# Patient Record
Sex: FEMALE | Race: WHITE | Employment: FULL TIME | URBAN - METROPOLITAN AREA
[De-identification: names, ages, dates, MRNs, and addresses within clinical notes are randomized per-mention and may not be internally consistent; named-entity substitution may affect disease eponyms.]

---

## 2024-05-22 ENCOUNTER — HOSPITAL ENCOUNTER (OUTPATIENT)
Facility: HOSPITAL | Age: 49
Setting detail: OUTPATIENT SURGERY
Discharge: HOME | End: 2024-05-22
Attending: INTERNAL MEDICINE | Admitting: INTERNAL MEDICINE
Payer: COMMERCIAL

## 2024-05-22 VITALS
OXYGEN SATURATION: 99 % | HEART RATE: 74 BPM | BODY MASS INDEX: 34.74 KG/M2 | SYSTOLIC BLOOD PRESSURE: 114 MMHG | WEIGHT: 208.5 LBS | TEMPERATURE: 98 F | DIASTOLIC BLOOD PRESSURE: 70 MMHG | HEIGHT: 65 IN | RESPIRATION RATE: 17 BRPM

## 2024-05-22 LAB — HCG UR QL: NEGATIVE

## 2024-05-22 PROCEDURE — 6360000002 HC RX W HCPCS: Performed by: INTERNAL MEDICINE

## 2024-05-22 PROCEDURE — 99153 MOD SED SAME PHYS/QHP EA: CPT | Performed by: INTERNAL MEDICINE

## 2024-05-22 PROCEDURE — 2709999900 HC NON-CHARGEABLE SUPPLY: Performed by: INTERNAL MEDICINE

## 2024-05-22 PROCEDURE — 3600007512: Performed by: INTERNAL MEDICINE

## 2024-05-22 PROCEDURE — 88305 TISSUE EXAM BY PATHOLOGIST: CPT

## 2024-05-22 PROCEDURE — 7100000010 HC PHASE II RECOVERY - FIRST 15 MIN: Performed by: INTERNAL MEDICINE

## 2024-05-22 PROCEDURE — 7100000011 HC PHASE II RECOVERY - ADDTL 15 MIN: Performed by: INTERNAL MEDICINE

## 2024-05-22 PROCEDURE — 99152 MOD SED SAME PHYS/QHP 5/>YRS: CPT | Performed by: INTERNAL MEDICINE

## 2024-05-22 PROCEDURE — 81025 URINE PREGNANCY TEST: CPT

## 2024-05-22 PROCEDURE — 3600007502: Performed by: INTERNAL MEDICINE

## 2024-05-22 RX ORDER — FENTANYL CITRATE 50 UG/ML
INJECTION, SOLUTION INTRAMUSCULAR; INTRAVENOUS PRN
Status: DISCONTINUED | OUTPATIENT
Start: 2024-05-22 | End: 2024-05-22 | Stop reason: ALTCHOICE

## 2024-05-22 RX ORDER — METAXALONE 800 MG/1
800 TABLET ORAL
COMMUNITY
Start: 2024-05-05

## 2024-05-22 RX ORDER — PRAVASTATIN SODIUM 40 MG
40 TABLET ORAL DAILY
COMMUNITY

## 2024-05-22 RX ORDER — TRAZODONE HYDROCHLORIDE 100 MG/1
100 TABLET ORAL
COMMUNITY

## 2024-05-22 RX ORDER — LOSARTAN POTASSIUM 50 MG/1
50 TABLET ORAL DAILY
COMMUNITY
Start: 2024-03-13

## 2024-05-22 RX ORDER — PREDNISONE 10 MG/1
10 TABLET ORAL AS NEEDED
COMMUNITY
Start: 2024-03-22

## 2024-05-22 RX ORDER — SODIUM CHLORIDE 9 MG/ML
INJECTION, SOLUTION INTRAVENOUS CONTINUOUS
Status: DISCONTINUED | OUTPATIENT
Start: 2024-05-22 | End: 2024-05-22 | Stop reason: HOSPADM

## 2024-05-22 RX ORDER — MIDAZOLAM HYDROCHLORIDE 1 MG/ML
INJECTION INTRAMUSCULAR; INTRAVENOUS PRN
Status: DISCONTINUED | OUTPATIENT
Start: 2024-05-22 | End: 2024-05-22 | Stop reason: ALTCHOICE

## 2024-05-22 RX ORDER — FUROSEMIDE 20 MG/1
20 TABLET ORAL DAILY
COMMUNITY
Start: 2024-02-14

## 2024-05-22 RX ORDER — DESVENLAFAXINE SUCCINATE 50 MG/1
50 TABLET, EXTENDED RELEASE ORAL DAILY
COMMUNITY

## 2024-05-22 RX ORDER — MELOXICAM 15 MG/1
15 TABLET ORAL DAILY
COMMUNITY
Start: 2021-05-13

## 2024-05-22 ASSESSMENT — PAIN - FUNCTIONAL ASSESSMENT
PAIN_FUNCTIONAL_ASSESSMENT: NONE - DENIES PAIN
PAIN_FUNCTIONAL_ASSESSMENT: 0-10
PAIN_FUNCTIONAL_ASSESSMENT: NONE - DENIES PAIN
PAIN_FUNCTIONAL_ASSESSMENT: 0-10

## 2024-05-22 ASSESSMENT — PAIN DESCRIPTION - DESCRIPTORS: DESCRIPTORS: ACHING

## 2024-05-22 NOTE — PROCEDURES
[facility]  Colonoscopy Procedure Report  _______________________________________________________  Patient: Sindy Barnes                                        Attending Physician: JOSE Payton MD    Patient ID: [id number]                                    Referring Physician: Gianna Aguirre APRN - [id number]    Exam Date: 5/22/2024     Introduction: A  48 y.o. female patient, presents for inpatient Colonoscopy    Indications: 49 yo male a Pt of [facility], here for her first screening colonoscopy.   No FHx of colon cancer. Asymptomatic of GI complaints. BMI: 35.28 (Short Frame), BM: Irregular every 1 to 3 days. Had appendectomy. HTN, BMI 34.70.  Consent: The benefits, risks, and alternatives to the procedure were discussed and informed consent was obtained from the patient.    Preparation: EKG, pulse, pulse oximetry and blood pressure were monitored throughout the procedure. ASA Classification: Class II- . The heart is an S1-S2 and regular heart rate and rhythm. Lungs are clear to auscultation and percussion. Abdomen is soft, nondistended, and nontender. Mental Status: awake, alert, and oriented to person, place, and time    Medications:  Fentanyl 100 mcg IV before procedure.  Versed 5 mg IV throughout the procedure.    Rectal Exam: Normal Rectal Exam. No Blood.     Pathology Specimens:  1    Procedure:  The colonoscope was passed with difficulty through the anus under direct visualization and advanced to the cecum and 5 cm inside the terminal ileum. Retroflexion is made in the ascending colon. The patient required positioning on the back and some external counter pressure to aid in the passage of the scope. The scope was withdrawn and the mucosa was carefully examined. The quality of the preparation was excellent. The views were excellent. The patient's toleration of the procedure was excellent. The exam was done twice to the cecum. Total time is 23 minutes and withdrawal time is 16

## 2024-05-22 NOTE — PRE SEDATION
Sedation Pre-Procedure Note    Patient Name: Sindy Barnes   YOB: 1975  Room/Bed: ENDO/PL  Medical Record Number: [id number]  Date: 5/22/2024   Time: 10:55 AM       Indication:  screen colon cancer    Consent: I have discussed with the patient and/or the patient representative the indication, alternatives, and the possible risks and/or complications of the planned procedure and the anesthesia methods. The patient and/or patient representative appear to understand and agree to proceed.    Vital Signs:   Vitals:    05/22/24 1010   BP: 131/85   Pulse: 85   Resp: 16   Temp: 98.1 °F (36.7 °C)   SpO2: 100%       Past Medical History:   has a past medical history of Hypertension.    Past Surgical History:   has a past surgical history that includes Appendectomy (2003) and Feminizing augmentation mammoplasty (2007).    Medications:   Scheduled Meds:   Continuous Infusions:   PRN Meds:   Home Meds:   Prior to Admission medications    Medication Sig Start Date End Date Taking? Authorizing Provider   losartan (COZAAR) 50 MG tablet Take 1 tablet by mouth daily 3/13/24  Yes Do Jamison MD   meloxicam (MOBIC) 15 MG tablet Take 1 tablet by mouth daily 5/13/21  Yes Do Jamison MD   metaxalone (SKELAXIN) 800 MG tablet Take 1 tablet by mouth Daily with supper 5/5/24  Yes Do Jamison MD   predniSONE (DELTASONE) 10 MG tablet Take 1 tablet by mouth as needed 3/22/24  Yes Do Jamison MD   furosemide (LASIX) 20 MG tablet Take 1 tablet by mouth daily 2/14/24  Yes Do Jamison MD   pravastatin (PRAVACHOL) 40 MG tablet Take 1 tablet by mouth daily    Do Jamison MD   traZODone (DESYREL) 100 MG tablet Take 1 tablet by mouth nightly    Do Jamison MD   desvenlafaxine succinate (PRISTIQ) 50 MG TB24 extended release tablet Take 1 tablet by mouth daily    Do Jamison MD     Coumadin Use Last 7 Days:  no  Antiplatelet drug therapy use last 7 days:  no  Other anticoagulant use last 7 days: no  Additional Medication Information:  None      Pre-Sedation Documentation and Exam:   Vital signs have been reviewed (see flow sheet for vitals).  I have reviewed the patient's history and review of systems.    Mallampati Airway Assessment:  normal, dentition not prohibitive, normal neck range of motion, Mallampati Class III - (soft palate & base of uvula are visible)    Prior History of Anesthesia Complications:   none    ASA Classification:  Class 2 - A normal healthy patient with mild systemic disease    Sedation/ Anesthesia Plan:   intravenous sedation    Medications Planned:   midazolam (Versed) intravenously and fentanyl intravenously    Patient is an appropriate candidate for plan of sedation: yes    Electronically signed by Ej Payton MD on 5/22/2024 at 10:55 AM

## 2024-05-22 NOTE — DISCHARGE INSTRUCTIONS
Sindy Barnes  [id number]  1975    COLON DISCHARGE INSTRUCTIONS    Discomfort:  Redness at IV site- apply warm compress to area; if redness or soreness persist- contact your physician  There may be a slight amount of blood passed from the rectum  Gaseous discomfort- walking, belching will help relieve any discomfort  You may not operate a vehicle til the next day.  You may not engage in an occupation involving machinery or appliances til the next day.  You may not drink alcoholic beverages til the next day.    DIET:   High fiber diet.     ACTIVITY:  You may not  resume your normal daily activities til the next day. it is recommended that you spend the remainder of the day resting -  avoid any strenuous activity.    CALL M.D.  IF ANY SIGN OF:   Increasing pain, nausea, vomiting  Abdominal distension (swelling)  New increased bleeding (oral or rectal)  Fever (chills)  Pain in chest area  Bloody discharge from nose or mouth  Shortness of breath    You may not  take any Advil, Aspirin, Ibuprofen, Motrin, Aleve, or Goody’s for 5 days, ONLY  Tylenol as needed for pain.    Post procedure diagnosis:  Tortuous and fixed sigmoid. 4 mm sessile polyp in the proximal transversae colon and 3 mm flat polyp in the distal transverse colon, cold snared. Flat harmless lipoma in theproximal ascending colon    Follow-up Instructions:   Your follow up colonoscopy will be in 10 years.    We will notify you the results of your biopsy by letter within 2 weeks.    Ej Payton MD  May 22, 2024      DISCHARGE SUMMARY from Nurse    PATIENT INSTRUCTIONS:    After general anesthesia or intravenous sedation, for 24 hours or while taking prescription Narcotics:  Limit your activities  Do not drive and operate hazardous machinery  Do not make important personal or business decisions  Do  not drink alcoholic beverages  If you have not urinated within 8 hours after discharge, please contact your surgeon on call.    Report the following  provided.  ___________________________________________________________________________________________________________________________________

## 2024-05-22 NOTE — H&P
ssessment/Plan  # Detail Type Description    1. Assessment Encounter for screening colonoscopy (Z12.11).    Impression Pt of [facility], here for her first screening colonoscopy.   Average risk, no FHx of colon cancer. Asymptomatic of GI complaints. BMI: 35.28 (Short Frame), BM: Irregular    Pt reports N/V experienced with drinking barium contrast for CT scan in the past. I have explained to the patient that the bowel prep will be completely dissolved and a water-thin liquid. Pt requests anti-emetic with bowel prep anyway. Will pre-medicate with Reglan for this c-scope procedure (see plans below)..    Patient Plan -Patient is advised that they should take one dose of Reglan 30 minutes before each half of bowel prep, prior to colonoscopy, to pre-medicate for nausea/vomiting.    *C-scope Plan:  Colonoscopy ordered with Dr. Payton with Golytely/Gavilyte bowel prep, and Miralax and stool softeners starting 3 days before prep.  -Patient is advised that they should take their aspirin (if prescribed) up until the day of procedure.  -Patient is advised to take Thyroid meds, BP meds, beta blockers, and any cardiac meds the AM of procedure with sip clear liquid after prep.     *C-scope Risks:  Stressed importance of following all bowel preparation instructions. Explained the procedure to the patient including all risks and benefits.  These risks consist of missed lesions on exam, bleeding, and bowel perforation with possible need for admission to the hospital, and in the most extensive of  circumstances, the patient may require surgery. Pt verbalized understanding of these risks and is agreeable with this procedure.    Plan Orders Further diagnostic evaluations ordered today include(s) DIAGNOSTIC COLONOSCOPY to be performed.         2. Assessment Family hx of colonic polyps (Z83.71).    Impression Mother.              This 48 year old  patient was referred by Gianna Aguirre.    This 48 year old female presents for    10/13/2020 12:00:00 AM Influenza Quadrivalent P-Free   5/9/2008 12:00:00 AM TdaP > 7 years   3/16/1979 12:00:00 AM Polio-Oral   3/16/1979 12:00:00 AM Measles-Rubella   3/14/1979 12:00:00 AM DTP   1/7/1975 12:00:00 AM Polio-Oral   1/7/1975 12:00:00 AM DTP   1975 12:00:00 AM Polio-Oral   1975 12:00:00 AM DTP   1975 12:00:00 AM DTP   1/13/2022 12:00:00 AM SARS-COV-2 (COVID-19) vaccine, vector non-replicating, recombinant spike protein-Ad26, preservative free, 0.5 mL   4/29/2021 12:00:00 AM SARS-COV-2 (COVID-19) vaccine, mRNA, spike protein, LNP, preservative free, 100 mcg/0.5mL dose   4/1/2021 12:00:00 AM SARS-COV-2 (COVID-19) vaccine, mRNA, spike protein, LNP, preservative free, 100 mcg/0.5mL dose       Active Patient Care Team Members  Name Contact Agency Type Support Role Relationship Active Date Inactive Date Specialty   Karyn Barnes   Emergency Contact Mother      Arie Khadijah   Patient provider PCP   Internal Medicine   Keri Barnes   Emergency Contact Mother      Claribel Ricardo   encounter provider    Gastroenterology   Patient is questioned and examined

## (undated) DEVICE — WRISTBAND ID AD W2.5XL9.5CM RED VYN ADH CLSR UNI-PRINT

## (undated) DEVICE — Device

## (undated) DEVICE — SOLUTION IV 1000ML 0.9% SOD CHL PH 5 INJ USP VIAFLX PLAS

## (undated) DEVICE — TUBING, SUCTION, 1/4" X 12', STRAIGHT: Brand: MEDLINE

## (undated) DEVICE — CANNULA CUSH AD W/ 14FT TBG

## (undated) DEVICE — SYRINGE MED 5ML STD CLR PLAS LUERLOCK TIP N CTRL DISP

## (undated) DEVICE — CATHETER PH SUCT 14FR

## (undated) DEVICE — CATHETER IV 22GA L1IN BLU POLYUR STR HUB RADPQ PROTCT +

## (undated) DEVICE — SYRINGE MEDICAL 3ML CLEAR PLASTIC STANDARD NON CONTROL LUERLOCK TIP DISPOSABLE

## (undated) DEVICE — TOURNIQUET PHLEB W1XL18IN BLU FLAT RL AND BND REUSE FOR IV

## (undated) DEVICE — KENDALL RADIOLUCENT FOAM MONITORING ELECTRODE RECTANGULAR SHAPE: Brand: KENDALL

## (undated) DEVICE — BLUNTFILL: Brand: MONOJECT

## (undated) DEVICE — SYRINGE 50ML E/T